# Patient Record
Sex: FEMALE | Race: WHITE
[De-identification: names, ages, dates, MRNs, and addresses within clinical notes are randomized per-mention and may not be internally consistent; named-entity substitution may affect disease eponyms.]

---

## 2017-10-27 ENCOUNTER — HOSPITAL ENCOUNTER (OUTPATIENT)
Dept: HOSPITAL 45 - C.MRI | Age: 67
Discharge: HOME | End: 2017-10-27
Attending: NURSE PRACTITIONER
Payer: COMMERCIAL

## 2017-10-27 DIAGNOSIS — S46.812A: ICD-10-CM
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
## 2017-10-27 DIAGNOSIS — S43.432A: Primary | ICD-10-CM

## 2017-10-27 NOTE — DIAGNOSTIC IMAGING REPORT
LEFT SHOULDER MRI



HISTORY: Left shoulder pain.  INJURY TO LEFT SHOULDER



TECHNIQUE: Multiplanar multisequence MRI of the left shoulder was performed

without contrast.



COMPARISON STUDY:  None.



FINDINGS: 



AC joint: Mild AC joint arthrosis demonstrated by joint space narrowing, joint

fluid, and tiny marginal osteophytes.



Rotator cuff: The subscapularis and teres minor tendons are intact. Small focal

arterial tears at the undersurface of the distal infraspinatus and distal

supraspinatus tendons. There is also tiny linear tear at the bursal surface of

the mid supraspinatus tendon best seen on coronal T2 image 13. No definite

evidence for full-thickness rotator cuff tear.



Labrum: Abnormal intrasubstance signal within the superior labrum best seen on

coronal image 11 consistent with a SLAP tear.



Biceps tendon: Slight increased T2 signal within the proximal long head of the

biceps tendon consistent with a mild tendinopathy.



Bones: No acute fracture or dislocation. Subchondral cystic change within the

superior glenoid. There is also small amount of cystic change at the lateral

humeral head which is likely chronic.



Cartilage: Focal full-thickness cartilage loss within the superior glenoid which

measures 14 x 8 mm. There is also mild cortical thinning within the humeral

head.



Miscellaneous: No significant joint effusion. Soft tissue edema along the

posterior fibers of the inferior clinical malignant concerning for a partial

tear.



IMPRESSION:  



1. Partial tears within the supraspinatus and infraspinatus tendons as described

above. No evidence for full-thickness rotator cuff tear.

2. SLAP tear.

3. Edema within the posterior fibers of the inferior glenohumeral ligament which

is concerning for a partial tear.

4. Mild tendinopathy within the long head of the biceps tendon.

5. Mild to moderate osteoarthritis at the glenohumeral joint. 







Electronically signed by:  Rosales Joel M.D.

10/27/2017 12:11 PM



Dictated Date/Time:  10/27/2017 12:02 PM

## 2018-05-21 ENCOUNTER — HOSPITAL ENCOUNTER (OUTPATIENT)
Dept: HOSPITAL 45 - C.MRIBC | Age: 68
Discharge: HOME | End: 2018-05-21
Attending: ORTHOPAEDIC SURGERY
Payer: COMMERCIAL

## 2018-05-21 DIAGNOSIS — M17.9: Primary | ICD-10-CM

## 2018-05-21 NOTE — DIAGNOSTIC IMAGING REPORT
LEFT KNEE MRI



HISTORY:      LEFT KNEE DEGENERATIVE JOINT DISEASE



COMPARISON STUDY:  Left knee 9/9/2016. 



TECHNIQUE: Multiplanar multisequence MRI of the left knee was performed

according to standard department protocol without the use of contrast. 



FINDINGS:



Menisci: Diminutive body and posterior horn of the medial meniscus which may be

due to prior partial meniscectomy or a chronic tear. There is also small oblique

tear at the undersurface of the body of the lateral meniscus. The medial

meniscus is slightly extruded from the joint space.



Ligaments: The anterior and posterior cruciate ligaments are intact. The medial

and lateral collateral ligaments are normal in appearance.



Extensor mechanism: The quadriceps tendon and patellar ligament are intact.



Articular cartilage and bone: No fracture or dislocation within the left knee.

Small focal areas of subchondral edema seen within the patella as well as the

femoral condyles and lateral tibial plateau due to the long-standing

degenerative change. There are tricompartmental marginal osteophytes. Greater

than 50% cartilage thinning within the lateral patellar facet with focal

full-thickness cartilage loss at the median ridge of the patella measuring 9 mm.

There is also mild cartilage thinning within the medial patellar facet. Large

area of full-thickness cartilage loss involving the central weightbearing

portions of the medial femoral condyle and medial tibial plateau. There is mild

cartilage thinning within the lateral compartment of the knee.



Joint effusion: None.



Soft tissues:  Mild subcutaneous edema within the anterior aspect of the knee.

Subcentimeter popliteal cyst.



IMPRESSION:  



1. Tricompartmental osteoarthritis as described above most pronounced at the

medial compartment where there is a large area of full-thickness cartilage loss.

2. Diminutive body and posterior horn of the medial meniscus which may be due to

prior partial meniscectomy or a chronic tear. 

3. Small oblique tear at the undersurface of the body of the lateral meniscus.







Electronically signed by:  Rosales Joel M.D.

5/21/2018 1:56 PM



Dictated Date/Time:  5/21/2018 1:42 PM